# Patient Record
(demographics unavailable — no encounter records)

---

## 2025-01-07 NOTE — HISTORY OF PRESENT ILLNESS
[Born at ___ Wks Gestation] : The patient was born at [unfilled] weeks gestation [BW: _____] : weight of [unfilled] [Length: _____] : length of [unfilled] [HC: _____] : head circumference of [unfilled] [] : via normal spontaneous vaginal delivery [(1) _____] : [unfilled] [(5) _____] : [unfilled] [Other: ____] : [unfilled] [Time of Birth: _____] : Time of birth was [unfilled] [[facility]] : [facility] [Age: ___] : [unfilled] year old mother [G: ___] : G [unfilled] [P: ___] : P [unfilled] [Significant Hx: ____] : The mother's  medical history is significant for [unfilled] [Rubella (Immune)] : Rubella immune [MBT: ____] : MBT - [unfilled] [] : Circumcision: Yes [Formula ___ oz/feed] : [unfilled] oz of formula per feed [Hours between feeds ___] : Child is fed every [unfilled] hours [Normal] : Normal [___ voids per day] : [unfilled] voids per day [Frequency of stools: ___] : Frequency of stools: [unfilled]  stools [per day] : per day. [Dark green] : dark green [Yellow] : yellow [Seedy] : seedy [Loose] : loose consistency [In Bassinet/Crib] : sleeps in bassinet/crib [On back] : sleeps on back [No] : No cigarette smoke exposure [Rear facing car seat in back seat] : Rear facing car seat in back seat [Carbon Monoxide Detectors] : Carbon monoxide detectors at home [Smoke Detectors] : Smoke detectors at home. [Hepatitis B Vaccine Given] : Hepatitis B vaccine given [DW: _____] : Discharge weight was [unfilled] [RSV vaccine] : RSV vaccine not received by mother at least 14 days prior to delivery [HepBsAG] : HepBsAg negative [HIV] : HIV negative [GBS] : GBS negative [VDRL/RPR (Reactive)] : VDRL/RPR nonreactive [Yes] : Yes [FreeTextEntry8] : Was in High Risk Nursery for 3 days for monitoring hypoglycemia for SGA  [Co-sleeping] : no co-sleeping [Loose bedding, pillow, toys, and/or bumpers in crib] : no loose bedding, pillow, toys, and/or bumpers in crib [Pacifier] : Not using pacifier [Exposure to electronic nicotine delivery system] : No exposure to electronic nicotine delivery system [de-identified] : [employer] for gas [FreeTextEntry1] : Hospital Course FT 37.6 wk SGA infant boy born by  after IOL for preeclampsia . ROM 12 hours 56 minutes, clear, Apgars /, BW 2290g( 3%), HC-_29.5cm(0%), Length-46cm(9%). Born to a 31 y.o. G 1 P0 mom. Blood type AB+, prenatal RPR-NR(10/9/24) intrapartum RPR-NR (24), HBsAg-negative(10/9/24), HIV-negative (12/10/24), Rubella-immune (10/9/24), GBS-negative 12/10/24, UDS- negative, with history of hypothyroidism on Synthyroid 75mcg, PCOS on metformin prior to pregnancy, transfer of care at 25 wks from [place]. Infant's initial Dstix 39 infant feed and given dextrose gel repeat dstix 36, infant gel and feed again dstix 31. Will admit to High Risk Nursery for hypoglycemia.  Date of Birth: 24 Date of Admission: 24 Time of Birth: 23:18 Date of Discharge: 24 Gestational Age: 37.6 Corrected Gestational Age at discharge: 38.1weeks    Birth weight: 2290g (3%) Birth length: 46cm (9%) Birth head circumference: 29.5cm (0%)  Hospital course: Infant was cared for in NICU/High risk for 3 days.  RESP: Stable om Room air throughout hospital stay.  CARDIO: Hemodynamically stable.  FEN/GI: Feeding sim 20 kristyn , taking 20-40ml q 3 hr. Infant required D10 W and was weaned as glucose stabilized. Last 3 Dsticks off ivf were: 67,78,78. Voiding and stooling appropriately.  HEME: Bilirubin was 4.9 at 25 hol with threshold of 11.9. Moms blood type is AB+.  ID: remained normothermic. UCMV sent out and pending. Done due to symmetrical sga NEURO: HUS normal (24). Done due to symmetrical sga and HC =o%   OTHER:  Discharge weight: 2310g  Physical Exam on Discharge: General: Alert, awake, pink HEENT: AFOSF, no cleft lip or palate, red reflexes intact Chest: CTA b/l with equal air entry, no increased work of breathing Cardio: No murmur, pulses equal b/l, cap refill <2sec Abdomen: Soft, nondistended, nontender, no palpable masses : normal genitalia for age Anus: appears patent Neuro:  reflexes intact, tone appropriate for gestational age Extremities: FROM all 4 extremities equally, 10 fingers, 10 toes  Infant is stable and cleared for discharge. Feeding Plan: ad say feeds sim 20 q3hr or breast feeding ad say [place]  screening was found to be unsuitable   Discharge plan: [x] Immunizations: Hep B given on 2024 [x] Hearing passed on 2024 [x] PKU completed [x] CCHD passed Follow up appointments: Mom aware to f/u with Pediatrician 1-2 days after dischharge Symetrical SGA=f/u Ucmv

## 2025-01-07 NOTE — DISCUSSION/SUMMARY
[No Elimination Concerns] : elimination [Continue Regimen] : feeding [No Skin Concerns] : skin [Normal Sleep Pattern] : sleep [Anticipatory Guidance Given] : Anticipatory guidance addressed as per the history of present illness section [Hepatitis B In Hospital] : Hepatitis B administered while in the hospital [No Medications] : ~He/She~ is not on any medications [Mother] : mother [Parental Concerns Addressed] : Parental concerns addressed [Normal Growth] : growth [Term Infant] : term infant [ Transition] :  transition [ Care] :  care [Nutritional Adequacy] : nutritional adequacy [Parental Well-Being] : parental well-being [Safety] : safety [de-identified] : SGA [] : The components of the vaccine(s) to be administered today are listed in the plan of care. The disease(s) for which the vaccine(s) are intended to prevent and the risks have been discussed with the caretaker.  The risks are also included in the appropriate vaccination information statements which have been provided to the patient's caregiver.  The caregiver has given consent to vaccinate. [FreeTextEntry1] : 19 day old male born FT via , born SGA vis presenting for HCM, and Beyfortus immunization. Maternal prenatal labs negative. Growth and development normal. Has regained birthweight. PE unremarkable. Maternal depression screen passed. CCHD and hearing screens passed. [facility] NBS found to be unsuitable, advised to repeat to obtain suitable specimen, script provided. Immunizations UTD.  - Routine  care & anticipatory guidance given - Continue ad say feeds at least every 3 hours - Polyvisol as prescribed - Follow up repeat NBS, NBS [id number] unsuitable - G6PD NORMAL - RTC for 1 month HCM and prn  - Discussed STRICT precautions for seeking immediate medical attention including but not limited to fever of 100.4F or more, yellowing or increased yellowing of skin or eyes, redness, discharge or foul odor from umbilical stump, poor feeding, lethargy or decreased responsiveness, fast or labored breathing, less than 5 wet diapers daily, rash or any other concerning sign or symptom.     Caretaker expressed understanding of the plan and agrees. All questions were answered.

## 2025-01-07 NOTE — DEVELOPMENTAL MILESTONES
[Makes brief eye contact] : makes brief eye contact [Cries with discomfort] : cries with discomfort [Calms to adult voice] : calms to adult voice [Reflexively moves arms and legs] : reflexively moves arms and legs [Holds fingers closed] : holds fingers closed [Grasps reflexively] : grasp reflexively [Passed] : passed

## 2025-01-07 NOTE — PHYSICAL EXAM
[Alert] : alert [Normocephalic] : normocephalic [Flat Open Anterior [place]] : flat open anterior fontanelle [PERRL] : PERRL [Red Reflex Bilateral] : red reflex bilateral [Normally Placed Ears] : normally placed ears [Auricles Well Formed] : auricles well formed [Clear Tympanic membranes] : clear tympanic membranes [Light reflex present] : light reflex present [Bony structures visible] : bony structures visible [Patent Auditory Canal] : patent auditory canal [Nares Patent] : nares patent [Palate Intact] : palate intact [Uvula Midline] : uvula midline [Supple, full passive range of motion] : supple, full passive range of motion [Symmetric Chest Rise] : symmetric chest rise [Clear to Auscultation Bilaterally] : clear to auscultation bilaterally [Regular Rate and Rhythm] : regular rate and rhythm [S1, S2 present] : S1, S2 present [+2 Femoral Pulses] : +2 femoral pulses [Soft] : soft [Bowel Sounds] : bowel sounds present [Umbilical Stump Dry, Clean, Intact] : umbilical stump dry, clean, intact [Normal external genitailia] : normal external genitalia [Central Urethral Opening] : central urethral opening [Testicles Descended Bilaterally] : testicles descended bilaterally [Patent] : patent [Normally Placed] : normally placed [No Abnormal Lymph Nodes Palpated] : no abnormal lymph nodes palpated [Symmetric Flexed Extremities] : symmetric flexed extremities [Startle Reflex] : startle reflex present [Suck Reflex] : suck reflex present [Rooting] : rooting reflex present [Palmar Grasp] : palmar grasp present [Plantar Grasp] : plantar reflex present [Symmetric [place]] : symmetric [place] [Acute Distress] : no acute distress [Icteric sclera] : nonicteric sclera [Discharge] : no discharge [Palpable Masses] : no palpable masses [Murmurs] : no murmurs [Tender] : nontender [Distended] : not distended [Hepatomegaly] : no hepatomegaly [Splenomegaly] : no splenomegaly [Circumcised] : circumcised [Raphael-Ortolani] : negative Raphael-Ortolani [Spinal Dimple] : no spinal dimple [Tuft of Hair] : no tuft of hair [Jaundice] : not jaundice